# Patient Record
Sex: MALE | URBAN - METROPOLITAN AREA
[De-identification: names, ages, dates, MRNs, and addresses within clinical notes are randomized per-mention and may not be internally consistent; named-entity substitution may affect disease eponyms.]

---

## 2024-01-02 ENCOUNTER — OFFICE VISIT (OUTPATIENT)
Dept: URBAN - METROPOLITAN AREA CLINIC 31 | Facility: CLINIC | Age: 49
End: 2024-01-02

## 2024-01-02 ENCOUNTER — TELEPHONE ENCOUNTER (OUTPATIENT)
Dept: URBAN - METROPOLITAN AREA CLINIC 33 | Facility: CLINIC | Age: 49
End: 2024-01-02

## 2024-01-23 ENCOUNTER — LAB OUTSIDE AN ENCOUNTER (OUTPATIENT)
Dept: URBAN - METROPOLITAN AREA CLINIC 31 | Facility: CLINIC | Age: 49
End: 2024-01-23

## 2024-01-23 ENCOUNTER — DASHBOARD ENCOUNTERS (OUTPATIENT)
Age: 49
End: 2024-01-23

## 2024-01-23 ENCOUNTER — OFFICE VISIT (OUTPATIENT)
Dept: URBAN - METROPOLITAN AREA CLINIC 31 | Facility: CLINIC | Age: 49
End: 2024-01-23
Payer: COMMERCIAL

## 2024-01-23 VITALS — WEIGHT: 187 LBS | HEIGHT: 69 IN | BODY MASS INDEX: 27.7 KG/M2

## 2024-01-23 DIAGNOSIS — K62.5 RECTAL BLEEDING: ICD-10-CM

## 2024-01-23 DIAGNOSIS — Z12.11 SCREENING FOR COLON CANCER: ICD-10-CM

## 2024-01-23 DIAGNOSIS — R10.32 LEFT LOWER QUADRANT ABDOMINAL PAIN: ICD-10-CM

## 2024-01-23 PROCEDURE — 99204 OFFICE O/P NEW MOD 45 MIN: CPT | Performed by: STUDENT IN AN ORGANIZED HEALTH CARE EDUCATION/TRAINING PROGRAM

## 2024-01-23 RX ORDER — SODIUM PICOSULFATE, MAGNESIUM OXIDE, AND ANHYDROUS CITRIC ACID 12; 3.5; 1 G/175ML; G/175ML; MG/175ML
175 ML THE FIRST DOSE THE EVENING BEFORE AND SECOND DOSE THE MORNING OF COLONOSCOPY LIQUID ORAL ONCE A DAY
Qty: 350 | OUTPATIENT
Start: 2024-01-23 | End: 2024-01-25

## 2024-01-23 NOTE — HPI-TODAY'S VISIT:
48 year old male new patient presents today for a consultation for LLQ Abdominal Pain, since a month, feels a painful cramp. When it started had a couple days of feeling bloated in area. More intense pain has subsided, reports more of mild-moderate pain at present that persistent. Reports prior hx intermittent self limited similar symptoms. No change with eating. Reports had a BM with significant blood in toilet bowl last week. No recurrent blood in stools. Reports intermittent blood in past. Having BM once to twice per day. Reports formed stool.   He reports a bowel movement he had last week with mostly blood within the toilet bowel.  Last colonoscopy completed 10 years ago in [place]. He reports having similar symptoms with pain, and some rectal bleeding. 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 Reports no significant issues though since then with infrequent blood in stool. Denies hx known diverticulosis.   No imaging or lab for symptoms completed at this time

## 2024-01-31 ENCOUNTER — WEB ENCOUNTER (OUTPATIENT)
Dept: URBAN - METROPOLITAN AREA CLINIC 35 | Facility: CLINIC | Age: 49
End: 2024-01-31

## 2024-02-19 ENCOUNTER — COLON (OUTPATIENT)
Dept: URBAN - METROPOLITAN AREA SURGERY CENTER 8 | Facility: SURGERY CENTER | Age: 49
End: 2024-02-19

## 2024-03-19 ENCOUNTER — OV EP (OUTPATIENT)
Dept: URBAN - METROPOLITAN AREA CLINIC 31 | Facility: CLINIC | Age: 49
End: 2024-03-19

## 2024-03-19 NOTE — HPI-TODAY'S VISIT:
48 year old male patient presents today for a follow-up visit. Colonoscopy procedure not completed.  CT Abdomen/Pelvis With Contrast 01/26/2024 1. No acute abnormality 2. Cholelithiasis 3. Hepatic steatosis 4. Colonic diverticulosis 5. Chronic appearing, though age indeterminate, mild compression fracture of the L1 superior endplate  Last visit 01/23/2024 48 year old male new patient presents today for a consultation for LLQ Abdominal Pain, since a month, feels a painful cramp. When it started had a couple days of feeling bloated in area. More intense pain has subsided, reports more of mild-moderate pain at present that persistent. Reports prior hx intermittent self limited similar symptoms. No change with eating. Reports had a BM with significant blood in toilet bowl last week. No recurrent blood in stools. Reports intermittent blood in past. Having BM once to twice per day. Reports formed stool.   He reports a bowel movement he had last week with mostly blood within the toilet bowel.  Last colonoscopy completed 10 years ago in [place]. He reports having similar symptoms with pain, and some rectal bleeding. 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 Reports no significant issues though since then with infrequent blood in stool. Denies hx known diverticulosis.   No imaging or lab for symptoms completed at this time